# Patient Record
Sex: MALE | Race: WHITE | NOT HISPANIC OR LATINO
[De-identification: names, ages, dates, MRNs, and addresses within clinical notes are randomized per-mention and may not be internally consistent; named-entity substitution may affect disease eponyms.]

---
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
## 2021-01-04 ENCOUNTER — RX RENEWAL (OUTPATIENT)
Age: 21
End: 2021-01-04

## 2021-01-07 DIAGNOSIS — Q53.9 UNDESCENDED TESTICLE, UNSPECIFIED: ICD-10-CM

## 2021-01-25 ENCOUNTER — NON-APPOINTMENT (OUTPATIENT)
Age: 21
End: 2021-01-25

## 2021-01-25 ENCOUNTER — APPOINTMENT (OUTPATIENT)
Dept: PEDIATRIC ENDOCRINOLOGY | Facility: CLINIC | Age: 21
End: 2021-01-25
Payer: COMMERCIAL

## 2021-01-25 VITALS
SYSTOLIC BLOOD PRESSURE: 141 MMHG | TEMPERATURE: 208.22 F | WEIGHT: 83.33 LBS | HEART RATE: 123 BPM | DIASTOLIC BLOOD PRESSURE: 97 MMHG

## 2021-01-25 VITALS — SYSTOLIC BLOOD PRESSURE: 130 MMHG | HEART RATE: 108 BPM | DIASTOLIC BLOOD PRESSURE: 88 MMHG

## 2021-01-25 LAB
ALBUMIN SERPL ELPH-MCNC: 4.8 G/DL
ALP BLD-CCNC: 180 U/L
ALT SERPL-CCNC: 66 U/L
ANION GAP SERPL CALC-SCNC: 13 MMOL/L
AST SERPL-CCNC: 33 U/L
BASOPHILS # BLD AUTO: 0.03 K/UL
BASOPHILS NFR BLD AUTO: 0.3 %
BILIRUB SERPL-MCNC: <0.2 MG/DL
BUN SERPL-MCNC: 21 MG/DL
CALCIUM SERPL-MCNC: 10.4 MG/DL
CHLORIDE SERPL-SCNC: 95 MMOL/L
CO2 SERPL-SCNC: 33 MMOL/L
CREAT SERPL-MCNC: 0.72 MG/DL
EOSINOPHIL # BLD AUTO: 0.16 K/UL
EOSINOPHIL NFR BLD AUTO: 1.5 %
GLUCOSE SERPL-MCNC: 94 MG/DL
HCT VFR BLD CALC: 47 %
HGB BLD-MCNC: 14.4 G/DL
IGF-1 INTERP: NORMAL
IGF-I BLD-MCNC: 140 NG/ML
IMM GRANULOCYTES NFR BLD AUTO: 0.3 %
LYMPHOCYTES # BLD AUTO: 2.13 K/UL
LYMPHOCYTES NFR BLD AUTO: 20.2 %
MAN DIFF?: NORMAL
MCHC RBC-ENTMCNC: 27.8 PG
MCHC RBC-ENTMCNC: 30.6 GM/DL
MCV RBC AUTO: 90.7 FL
MONOCYTES # BLD AUTO: 0.55 K/UL
MONOCYTES NFR BLD AUTO: 5.2 %
NEUTROPHILS # BLD AUTO: 7.66 K/UL
NEUTROPHILS NFR BLD AUTO: 72.5 %
PLATELET # BLD AUTO: 377 K/UL
POTASSIUM SERPL-SCNC: 4.9 MMOL/L
PROT SERPL-MCNC: 7.7 G/DL
RBC # BLD: 5.18 M/UL
RBC # FLD: 12.3 %
SODIUM SERPL-SCNC: 141 MMOL/L
T4 FREE SERPL-MCNC: 1.1 NG/DL
WBC # FLD AUTO: 10.56 K/UL

## 2021-01-25 PROCEDURE — 99072 ADDL SUPL MATRL&STAF TM PHE: CPT

## 2021-01-25 PROCEDURE — 99215 OFFICE O/P EST HI 40 MIN: CPT

## 2021-01-25 NOTE — REVIEW OF SYSTEMS
[Nl] : Neurological [Pubertal Concerns] : pubertal concerns [Seizure] : seizures [Difficulties in Speech] : speech difficulties

## 2021-01-25 NOTE — PHYSICAL EXAM
[Healthy Appearing] : healthy appearing [Well Nourished] : well nourished [Interactive] : interactive [Normal Appearance] : normal appearance [Well formed] : well formed [Normally Set] : normally set [Normal S1 and S2] : normal S1 and S2 [Murmur] : no murmurs [Clear to Ausculation Bilaterally] : clear to auscultation bilaterally [Abdomen Soft] : soft [Abdomen Tenderness] : non-tender [] : no hepatosplenomegaly [4] : was Frederic stage 4 [Decreased Tone] : decreased tone [Normal] : normal  [de-identified] : alert but mostly non verbal,

## 2021-01-25 NOTE — PHYSICAL EXAM
[Healthy Appearing] : healthy appearing [Well Nourished] : well nourished [Interactive] : interactive [Normal Appearance] : normal appearance [Well formed] : well formed [Normally Set] : normally set [Normal S1 and S2] : normal S1 and S2 [Murmur] : no murmurs [Clear to Ausculation Bilaterally] : clear to auscultation bilaterally [Abdomen Soft] : soft [Abdomen Tenderness] : non-tender [] : no hepatosplenomegaly [4] : was Frederic stage 4 [Decreased Tone] : decreased tone [Normal] : normal  [de-identified] : alert but mostly non verbal,

## 2021-01-25 NOTE — HISTORY OF PRESENT ILLNESS
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  his case has been complicated by radiation necrosis. cortisol deficiency and hypothyroidism has been felt to be on a central basis. IN  the past IGF-1 levels have been normal to slightly low  , . He had issues with low Na  in the past  that may have been due to his feeding or his Trileptal  and have resolved.\par \par Avinash r had a bone density  in 2019 that was low, when corrected for his height  spine was normal but hip was low at\par -3.57 and -2.47 SD.\par Avinash has had low levels of testosterone with an elevated level of FSH, indicating testicular damage, likely secondary to his cancer therapies.  His testes have been nonpalpable and in the past testicular ultrasound has been ordered but has not been obtained.  In 2019 testosterone therapy was begun at 50 mg daily.  Avinash generally gets his injections monthly although there have been several interruptions in therapy.\par \par Avinash has been generally well since the time of the last visit.  Mom states that his level of activity and arousal can still vary from day to day but he is no longer having those long periods of lethargy.  He is having his physical therapy at home and he does walk with his mom.  Seizures appear to be under control.  Mom has a testicular ultrasound that has been scheduled.\par

## 2021-01-26 ENCOUNTER — APPOINTMENT (OUTPATIENT)
Dept: PEDIATRIC NEUROLOGY | Facility: CLINIC | Age: 21
End: 2021-01-26

## 2021-01-29 ENCOUNTER — RX RENEWAL (OUTPATIENT)
Age: 21
End: 2021-01-29

## 2021-01-29 LAB — OXCARBAZEPINE SERPL-MCNC: 37 UG/ML

## 2021-02-02 LAB
FREE T4-ESOTERIX: 1.06 NG/DL
IGF BINDING PROTEIN-3 (ESOTERIX-LAB): 3.36 MG/L

## 2021-02-03 ENCOUNTER — APPOINTMENT (OUTPATIENT)
Dept: PEDIATRIC NEUROLOGY | Facility: CLINIC | Age: 21
End: 2021-02-03
Payer: COMMERCIAL

## 2021-03-02 ENCOUNTER — RX RENEWAL (OUTPATIENT)
Age: 21
End: 2021-03-02

## 2021-03-29 ENCOUNTER — APPOINTMENT (OUTPATIENT)
Dept: PEDIATRIC NEUROLOGY | Facility: CLINIC | Age: 21
End: 2021-03-29
Payer: COMMERCIAL

## 2021-03-29 VITALS — TEMPERATURE: 208.76 F | WEIGHT: 84 LBS

## 2021-03-29 PROCEDURE — 99072 ADDL SUPL MATRL&STAF TM PHE: CPT

## 2021-03-29 PROCEDURE — 99213 OFFICE O/P EST LOW 20 MIN: CPT

## 2021-05-19 ENCOUNTER — APPOINTMENT (OUTPATIENT)
Dept: PEDIATRIC HEMATOLOGY/ONCOLOGY | Facility: CLINIC | Age: 21
End: 2021-05-19
Payer: COMMERCIAL

## 2021-05-19 VITALS — TEMPERATURE: 98 F | WEIGHT: 86.99 LBS

## 2021-05-19 PROCEDURE — 99215 OFFICE O/P EST HI 40 MIN: CPT

## 2021-05-19 PROCEDURE — 99072 ADDL SUPL MATRL&STAF TM PHE: CPT

## 2021-05-20 NOTE — SOCIAL HISTORY
[Mother] : mother [___ Brothers] : [unfilled] brothers [Secondhand Smoke] : no exposure to  secondhand smoke [FreeTextEntry1] : Special education [facility] program.

## 2021-05-20 NOTE — PHYSICAL EXAM
[Normal] : No murmurs, rubs, gallops [40: Disabled, requires special care and assistance.] : 40: Disabled, requires special care and assistance.  [Cervical Lymph Nodes Enlarged Posterior Bilaterally] : posterior cervical [Supraclavicular Lymph Nodes Enlarged Bilaterally] : supraclavicular [de-identified] : wheelchair bound, head with decreased skull growth posteriorly in area of radiation boost.  [de-identified] : asymmetric gaze; cataract seen in left eye.  [de-identified] : softly distended [de-identified] : unable to assess.  [de-identified] : limited ROM to all extremities, bilateral hands contracted.  [de-identified] : bilateral toes with several round brown pigmented macules [de-identified] : see neurology note.  [de-identified] : minimal affect; able to smile and shows understanding of conversation

## 2021-05-20 NOTE — CONSULT LETTER
[Dear  ___] : Dear  [unfilled], [Courtesy Letter:] : I had the pleasure of seeing your patient, [unfilled], in my office today. [Please see my note below.] : Please see my note below. [Consult Closing:] : Thank you very much for allowing me to participate in the care of this patient.  If you have any questions, please do not hesitate to contact me. [FreeTextEntry2] : Dr Joni Almazan\par [address], \[place] [address]\par [phone]\par  [FreeTextEntry1] : Avinash was seen for his annual follow-up appointment in the [facility] at the [facility].  [FreeTextEntry3] : \par DAMASO Hodges\par Family Nurse Practitioner \par [facility]\par [phone]\par \par \par   \par \par \par

## 2021-05-20 NOTE — HISTORY OF PRESENT ILLNESS
[Site: ____] : Site: [unfilled] [Dose: ____] : Dose: [unfilled] [de-identified] : 20.5 year-old male now 13.3 years off-therapy for Medulloblastoma s/p autogenic bone marrow transplant. Since his last visit in the Survivorship program on 2/10/2020,  SUKHI has been generally well without hospitalizations, surgeries or emergency room visits. See medication list below. SUKHI's post-treatment course has been complicated by neurological impairments, restrictive lung disease, obstructive sleep apnea, dysphagia, legally blind, hearing loss, panhypopituitarism, central hypothyroidism, wheelchair bound, epilepsy and scoliosis.  Denies having had COVID-19 and has recently been vaccinated. \par \par SUKHI has been attending a Boces Program at the [facility] and is in a special education program and has a lot of services which includes occupational therapy, speech therapy, vision therapy, physical therapy, and feeding therapy.  He has been doing remote learning due to COVID-19. Sukhi has been living at home with his parents and 2 brothers, he gets many hours of home care nursing each week..  SUKHI has been getting exercise in physical therapy several times a week and he goes in his stander for at least 45 minutes daily as well. He reported having a generally healthy diet, his diet consists of soft food and tube feeds, and mom reports it is well rounded. \par \par  [de-identified] : Celocoxib  YES \par Retinoic Acid   YES [de-identified] : 20,000 mg/m2  [de-identified] : 900 mg/m2  [de-identified] : 1,503 mg/m2  [de-identified] : 420 mg/m2 [de-identified] : 2,217 mg/m2  [de-identified] : YES [de-identified] : YES

## 2021-05-20 NOTE — REVIEW OF SYSTEMS
[Loss of Hearing] : loss of hearing [Negative] : Allergic/Immunologic [FreeTextEntry3] : legally blind. Frequent tearing.  [FreeTextEntry6] : restrictive lung disease, uses Bipap over night for obstructive sleep apnea.  [FreeTextEntry7] : nightly NG tube feeds.  [FreeTextEntry8] : reports undescended testicles.  [FreeTextEntry9] : wears brace daily for scoliosis.  [de-identified] : several round macules on bilateral toes, being followed by dermatology.   [de-identified] : global neurological deficits.

## 2021-08-05 ENCOUNTER — APPOINTMENT (OUTPATIENT)
Dept: PULMONOLOGY | Facility: CLINIC | Age: 21
End: 2021-08-05
Payer: COMMERCIAL

## 2021-08-05 VITALS
TEMPERATURE: 97 F | OXYGEN SATURATION: 95 % | HEIGHT: 60 IN | WEIGHT: 315 LBS | HEART RATE: 96 BPM | BODY MASS INDEX: 61.84 KG/M2 | DIASTOLIC BLOOD PRESSURE: 79 MMHG | SYSTOLIC BLOOD PRESSURE: 115 MMHG

## 2021-08-05 DIAGNOSIS — G47.36 SLEEP RELATED HYPOVENTILATION IN CONDITIONS CLASSIFIED ELSEWHERE: ICD-10-CM

## 2021-08-05 DIAGNOSIS — G47.9 SLEEP DISORDER, UNSPECIFIED: ICD-10-CM

## 2021-08-05 PROCEDURE — 99204 OFFICE O/P NEW MOD 45 MIN: CPT

## 2021-08-06 PROBLEM — G47.36 SLEEP-RELATED HYPOVENTILATION DUE TO MEDICAL CONDITION: Status: ACTIVE | Noted: 2021-08-05

## 2021-08-06 PROBLEM — G47.9 SLEEP DISTURBANCES: Status: ACTIVE | Noted: 2021-08-06

## 2021-08-06 NOTE — ASSESSMENT
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 Cullen is a 20 year old male with a history of anaplastic medulloblastoma s/p chemotherapy, RT, with subsequent seizure disorder, intellectual disability,  wheelchair bound on bipap at night who comes in to [facility] care. \par \par He is using bipap at night infrequently the indication of why he was using it in the first place was never clear. Patient did have a catastrophic event with his medulloblastoma which left him disabled. Given the significant neurological damage that occurred there could be significant abnormalities in his sleep breathing including central apneas, cheyne-velez respirations, and nocturnal hypoventilation. For this reason, the patient is required to obtain an inlab PSG with transcutaneous CO2 monitoring to evaluate not only the sleep disordered breathing but also his EEG pattern which may have abnormal sleep staging and progression. \par \par He was referred to the [facility] for a diagnostic PSG. THe will follow up with us after the PSG. If he requires bipap, he will need another titration study to determine if his current settings are in fact correct. \par \par His mother indicates that he has a resmed device which is not part of the recall. \par \par He received the COVID-19 vaccine. \par \par In terms of the cough, it appears chronic due to aspiration of saliva. No further intervention is needed at this time. \par \par He will follow up with after his sleep study. \par

## 2021-08-06 NOTE — HISTORY OF PRESENT ILLNESS
[Never] : never [Difficulty Maintaining Sleep] : difficulty maintaining sleep [TextBox_4] : Mr. Cullen is a 20 year old male with a history of anaplastic medulloblastoma s/p chemotherapy, RT, with subsequent seizure disorder, intellectual disability,  wheelchair bound on bipap at night who comes in to \[facility]\"" care. \par \par The patient's mother indicates that he was given a bipap machine because she was told it was to help "exercise his lungs" at night. He never underwent a sleep study for his bipap. The patient finds using the bipap machine cumbersome and takes it off after about an hour. The patient does not appear to be somnolent during the day and is engaged in activities and can participate mildly in some communication. \par \par The mother also indicates he has a chronic cough which has been attributed to chronic aspiration of his saliva. \par \par Patient cannot give Stovall at this time.  [Recent  Weight Gain] : no recent weight gain [Snoring] : no snoring [ESS] : N/A

## 2021-08-06 NOTE — PHYSICAL EXAM
[No Acute Distress] : no acute distress [Normal Oropharynx] : normal oropharynx [Low Lying Soft Palate] : low lying soft palate [IV] : Mallampati Class: IV [Normal Appearance] : normal appearance [No Neck Mass] : no neck mass [Normal Rate/Rhythm] : normal rate/rhythm [Normal S1, S2] : normal s1, s2 [No Murmurs] : no murmurs [No Resp Distress] : no resp distress [Clear to Auscultation Bilaterally] : clear to auscultation bilaterally [No Clubbing] : no clubbing [No Cyanosis] : no cyanosis [No Edema] : no edema [Oriented x3] : oriented x3 [Normal Affect] : normal affect [TextBox_2] : Sitting in wheelchair [TextBox_68] : poor inspiratory effort

## 2021-10-06 ENCOUNTER — RX RENEWAL (OUTPATIENT)
Age: 21
End: 2021-10-06

## 2021-12-20 ENCOUNTER — APPOINTMENT (OUTPATIENT)
Dept: PEDIATRIC ENDOCRINOLOGY | Facility: CLINIC | Age: 21
End: 2021-12-20

## 2021-12-23 ENCOUNTER — APPOINTMENT (OUTPATIENT)
Dept: PEDIATRIC ENDOCRINOLOGY | Facility: CLINIC | Age: 21
End: 2021-12-23
Payer: COMMERCIAL

## 2021-12-23 PROCEDURE — 99214 OFFICE O/P EST MOD 30 MIN: CPT | Mod: 95

## 2022-01-03 ENCOUNTER — APPOINTMENT (OUTPATIENT)
Dept: PEDIATRIC NEUROLOGY | Facility: CLINIC | Age: 22
End: 2022-01-03

## 2022-01-12 ENCOUNTER — APPOINTMENT (OUTPATIENT)
Dept: PEDIATRIC NEUROLOGY | Facility: CLINIC | Age: 22
End: 2022-01-12
Payer: COMMERCIAL

## 2022-01-12 PROCEDURE — 99213 OFFICE O/P EST LOW 20 MIN: CPT | Mod: 95

## 2022-01-12 RX ORDER — TESTOSTERONE ENANTHATE 200 MG/ML
200 INJECTION, SOLUTION INTRAMUSCULAR
Qty: 1 | Refills: 0 | Status: DISCONTINUED | COMMUNITY
Start: 2019-05-09 | End: 2022-01-12

## 2022-01-12 NOTE — HISTORY OF PRESENT ILLNESS
[Home] : at home, [unfilled] , at the time of the visit. 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  his case has been complicated by radiation necrosis. cortisol deficiency and hypothyroidism has been felt to be on a central basis. IN  the past IGF-1 levels have been normal to slightly low  , . He had issues with low Na  in the past  that may have been due to his feeding or his Trileptal  and have resolved.\par \par Avinash paredes had a bone density  in 2019 that was low, when corrected for his height  spine was normal but hip was low at\par -3.57 and -2.47 SD.\par Avinash has had low levels of testosterone with an elevated level of FSH, indicating testicular damage, likely secondary to his cancer therapies.  His testes have been nonpalpable , an ultrasound obtained  in 2/21 noted small but normal testes in the inguinal region In 2019 testosterone therapy was begun at 50 mg daily.  Avinash generally gets his injections monthly although there have been several interruptions in therapy.\par \par Avinash has been generally well since the time of the last visit.  Mom states that his level of activity and arousal can still vary from day to day but he is no longer having those long periods of lethargy.  He is having his physical therapy at home and he does walk with his mom.  Seizures appear to be under control. \par doing well, \par \par Since the last visit Avinash has gotten the J and J vaccine and a booster, He ha not needed stress steroids, \par \par \par except vaccine\par \par doing better on line , mi has pt equi at home \par \par nio seizures\par \par more awake periods \par tst ever 4 month \par \par \par \par \par \par

## 2022-01-12 NOTE — HISTORY OF PRESENT ILLNESS
[Home] : at home, [unfilled] , at the time of the visit. [Other Location: e.g. Home (Enter Location, City,State)___] : at [unfilled] [Mother] : mother [FreeTextEntry1] : Sukhi has hx of anaplastic medulloblastoma; s/p CTx, s/p RT, and subsequent radiation necrosis;  shunt. Last MRI Oct 2017 stable. He has partial complex seizures, on Trileptal. Typically, seizures are brief, eye rolling or deep breathing, lasting seconds, and occurring 1-2 times/month.\par He sees endo, cardiology, pulmonary.\par He was previously followed in BST clinic, and now in survivorship clinic.  \par Labs 1/25/2021 ordered by Dr. López: CBC & CMP stable, Na normal;  OXC 37 (10-35)\par Last visit 03/29/2021 ; no concerns\par \par 01/12/2022 follow up - TEB per mother's request\par Mother reports family stays home due to pandemic and did not want to take SUKHI out of the house. He is also doing virtual learning. He remains on Trileptal. Mother denies seizures\par \par MEDS:\par  - Trileptal 300 mg / 5 mL 7 mL (420mg) BID \par No side effects\par \par He continues to follow with survivorship clinic\par Transitioned to adult pulmonary

## 2022-01-12 NOTE — CONSULT LETTER
[Dear  ___] : Dear  [unfilled], [Consult Letter:] : I had the pleasure of evaluating your patient, [unfilled]. [Please see my note below.] : Please see my note below. [Consult Closing:] : Thank you very much for allowing me to participate in the care of this patient.  If you have any questions, please do not hesitate to contact me. [Sincerely,] : Sincerely, [FreeTextEntry3] : Lauren LEVIN\par Pediatric neurology attending\par Neurofibromatosis clinic Co-director\par [facility]\par [facility]\par Tel: [phone]\par Fax: [phone]\par

## 2022-01-12 NOTE — PHYSICAL EXAM
[Well Nourished] : well nourished [Interactive] : interactive [de-identified] : in wheelchair, answers questions

## 2022-01-12 NOTE — PHYSICAL EXAM
[de-identified] : sleepy but arousable; in wheel chair; spoke with me in few unclear words [de-identified] : no visual behavior; not tracking; sluggish pupil reaction. \par  [de-identified] : tried to reach for nose with left hand upon request

## 2022-01-12 NOTE — ASSESSMENT
[FreeTextEntry1] : 21 year old young man with history of anaplastic medulloblastoma s/p chemotherapy and radiation, s/p VPS, with history of radiation necrosis and seizure disorder, on Trileptal. Seizures are controlled. Exam limited due to TEB\par \par \par Plan:\par - Continue Trileptal 420 mg BID \par - labs - will forward order \par - Follow up with endocrinology, opthalmology, pulmonology \par - Follow up in 3 months\par Advised mother will start transition to adult neurology at next visit\par

## 2022-01-13 ENCOUNTER — NON-APPOINTMENT (OUTPATIENT)
Age: 22
End: 2022-01-13

## 2022-07-12 DIAGNOSIS — M85.80 OTHER SPECIFIED DISORDERS OF BONE DENSITY AND STRUCTURE, UNSPECIFIED SITE: ICD-10-CM

## 2022-07-12 DIAGNOSIS — Z79.899 OTHER LONG TERM (CURRENT) DRUG THERAPY: ICD-10-CM

## 2022-07-13 ENCOUNTER — APPOINTMENT (OUTPATIENT)
Dept: PEDIATRIC NEUROLOGY | Facility: CLINIC | Age: 22
End: 2022-07-13

## 2022-07-13 VITALS — WEIGHT: 85.19 LBS

## 2022-07-13 LAB
ALBUMIN SERPL ELPH-MCNC: 4.7 G/DL
ALP BLD-CCNC: 144 U/L
ALT SERPL-CCNC: 56 U/L
ANION GAP SERPL CALC-SCNC: 12 MMOL/L
AST SERPL-CCNC: 27 U/L
BILIRUB SERPL-MCNC: <0.2 MG/DL
BUN SERPL-MCNC: 16 MG/DL
CALCIUM SERPL-MCNC: 9.8 MG/DL
CHLORIDE SERPL-SCNC: 94 MMOL/L
CO2 SERPL-SCNC: 33 MMOL/L
CREAT SERPL-MCNC: 0.5 MG/DL
EGFR: 150 ML/MIN/1.73M2
GLUCOSE SERPL-MCNC: 98 MG/DL
POTASSIUM SERPL-SCNC: 4.2 MMOL/L
PROT SERPL-MCNC: 7.5 G/DL
SODIUM SERPL-SCNC: 139 MMOL/L

## 2022-07-13 PROCEDURE — 99213 OFFICE O/P EST LOW 20 MIN: CPT

## 2022-07-13 NOTE — PHYSICAL EXAM
[de-identified] : sleeping comfortably in the wheel chair [de-identified] : sleeping, eyes are closed [de-identified] : increased tone LE more than UE [de-identified] : brisk DTRs [de-identified] : non ambulatory

## 2022-07-13 NOTE — HISTORY OF PRESENT ILLNESS
[FreeTextEntry1] : Avinash has hx of anaplastic medulloblastoma; s/p CTx, s/p RT, and subsequent radiation necrosis;  shunt. Last MRI Oct 2017 stable. He has partial complex seizures, on Trileptal. Typically, seizures are brief, eye rolling or deep breathing, lasting seconds, and occurring 1-2 times/month.\par He sees endo, cardiology, pulmonary.\par He was previously followed in BST clinic, and now in survivorship clinic.  \par Last labs 1/25/2021 ordered by Dr. López: CBC & CMP stable, Na normal;  OXC 37 (10-35)\par \par Last visit 01/12/2022 TEB per mother's request: He remains on Trileptal. Mother denies seizures\par _________________\par \par 07/13/2022 follow up \par Mother reports brief seizures that happen when waking up from sleep, startle like behaviors, the eyes. Not more than that. Mother does not feel that he needs higher dose.\par \par MEDS:\par  - Trileptal 300 mg / 5 mL 7 mL (420mg) BID \par No side effects\par \par He continues to follow with survivorship clinic and will be transitioning to adult endo\par

## 2022-07-13 NOTE — REASON FOR VISIT
[Follow-Up Evaluation] : a follow-up evaluation for [Seizure Disorder] : seizure disorder [Mother] : mother [Other: _____] : [unfilled]

## 2022-07-13 NOTE — ASSESSMENT
[FreeTextEntry1] : 21 year old young man with history of anaplastic medulloblastoma s/p chemotherapy and radiation, s/p VPS, with history of radiation necrosis and seizure disorder, on Trileptal. Seizures are controlled. Exam stable and unchanged\par \par Plan:\par - Continue Trileptal 420 mg BID \par - labs  \par - Follow up with endocrinology, opthalmology, pulmonology \par - Transition to adult neurology; referral given\par \par

## 2022-07-13 NOTE — CONSULT LETTER
[Dear  ___] : Dear  [unfilled], [Consult Letter:] : I had the pleasure of evaluating your patient, [unfilled]. [Please see my note below.] : Please see my note below. [Consult Closing:] : Thank you very much for allowing me to participate in the care of this patient.  If you have any questions, please do not hesitate to contact me. [Sincerely,] : Sincerely, [FreeTextEntry3] : Lauren LEVIN\par Pediatric neurology attending\par Neurofibromatosis clinic Co-director\par [facility]\par [facility]\par Tel: [phone]\par Fax: [phone]\par

## 2022-07-25 ENCOUNTER — APPOINTMENT (OUTPATIENT)
Dept: PEDIATRIC HEMATOLOGY/ONCOLOGY | Facility: CLINIC | Age: 22
End: 2022-07-25

## 2022-07-25 VITALS
WEIGHT: 86.2 LBS | DIASTOLIC BLOOD PRESSURE: 74 MMHG | TEMPERATURE: 97.2 F | SYSTOLIC BLOOD PRESSURE: 106 MMHG | HEART RATE: 89 BPM

## 2022-07-25 DIAGNOSIS — H91.90 UNSPECIFIED HEARING LOSS, UNSPECIFIED EAR: ICD-10-CM

## 2022-07-25 DIAGNOSIS — H54.7 UNSPECIFIED VISUAL LOSS: ICD-10-CM

## 2022-07-25 DIAGNOSIS — Z99.89 DEPENDENCE ON OTHER ENABLING MACHINES AND DEVICES: ICD-10-CM

## 2022-07-25 DIAGNOSIS — Z92.3 PERSONAL HISTORY OF IRRADIATION: ICD-10-CM

## 2022-07-25 DIAGNOSIS — Z91.89 OTHER SPECIFIED PERSONAL RISK FACTORS, NOT ELSEWHERE CLASSIFIED: ICD-10-CM

## 2022-07-25 DIAGNOSIS — Z08 ENCOUNTER FOR FOLLOW-UP EXAMINATION AFTER COMPLETED TREATMENT FOR MALIGNANT NEOPLASM: ICD-10-CM

## 2022-07-25 DIAGNOSIS — Z92.21 PERSONAL HISTORY OF ANTINEOPLASTIC CHEMOTHERAPY: ICD-10-CM

## 2022-07-25 DIAGNOSIS — F88 OTHER DISORDERS OF PSYCHOLOGICAL DEVELOPMENT: ICD-10-CM

## 2022-07-25 DIAGNOSIS — Z94.81 BONE MARROW TRANSPLANT STATUS: ICD-10-CM

## 2022-07-25 PROCEDURE — 99215 OFFICE O/P EST HI 40 MIN: CPT

## 2022-07-26 PROBLEM — Z92.21 HISTORY OF CYTOXAN EXPOSURE: Status: ACTIVE | Noted: 2019-01-10

## 2022-07-26 PROBLEM — Z91.89 AT RISK FOR CORONARY ARTERY DISEASE: Status: ACTIVE | Noted: 2022-07-25

## 2022-07-26 PROBLEM — Z92.21 PERSONAL HISTORY OF ANTINEOPLASTIC CHEMOTHERAPY: Status: ACTIVE | Noted: 2019-01-10

## 2022-07-26 PROBLEM — Z94.81 HISTORY OF BONE MARROW TRANSPLANT: Status: ACTIVE | Noted: 2020-02-11

## 2022-07-26 PROBLEM — Z92.3 HISTORY OF RADIATION EXPOSURE: Status: ACTIVE | Noted: 2019-01-10

## 2022-07-26 PROBLEM — Z91.89 AT RISK FOR CARDIOMYOPATHY: Status: ACTIVE | Noted: 2022-07-12

## 2022-07-26 PROBLEM — Z08 ENCOUNTER FOR ROUTINE CANCER FOLLOW-UP: Status: ACTIVE | Noted: 2019-01-03

## 2022-07-26 PROBLEM — H91.90 HEARING LOSS: Status: ACTIVE | Noted: 2019-01-10

## 2022-07-26 NOTE — SOCIAL HISTORY
[Mother] : mother [Father] : father [___ Brothers] : [unfilled] brothers [Secondhand Smoke] : no exposure to  secondhand smoke [FreeTextEntry1] : special education Boc program.; last academic year starting in September 2022 and then will age out at age 22.

## 2022-07-26 NOTE — REVIEW OF SYSTEMS
[Loss of Hearing] : loss of hearing [Negative] : Allergic/Immunologic [FreeTextEntry3] : legally blind. Frequent tearing.  [FreeTextEntry6] : restrictive lung disease, uses Bipap over night for obstructive sleep apnea.  [FreeTextEntry7] : intermittent NG tube feeds.  [FreeTextEntry8] : reports undescended testicles.  [FreeTextEntry9] : history of brace wearing for scoliosis.  [de-identified] : several round macules on bilateral toes, being followed by dermatology.   [de-identified] : global neurological deficits.  [de-identified] : difficult to assess  [de-identified] : see HPI

## 2022-07-26 NOTE — CONSULT LETTER
[Courtesy Letter:] : I had the pleasure of seeing your patient, [unfilled], in my office today. [Please see my note below.] : Please see my note below. [Consult Closing:] : Thank you very much for allowing me to participate in the care of this patient.  If you have any questions, please do not hesitate to contact me. [Dear  ___] : Dear  [unfilled], [FreeTextEntry2] : DR CHELE HALE \par [facility] \par [address] \[place] [address]\par [phone]  [FreeTextEntry1] : Avinash was seen for his annual follow-up appointment in the [facility] Program at the [facility].  [FreeTextEntry3] : \par DAMASO Hodges\par Family Nurse Practitioner \par [facility]\par \par \par \par   \par \par \par

## 2022-07-26 NOTE — PHYSICAL EXAM
[Normal] : No murmurs, rubs, gallops [Cervical Lymph Nodes Enlarged Posterior Bilaterally] : posterior cervical [Supraclavicular Lymph Nodes Enlarged Bilaterally] : supraclavicular [40: Disabled, requires special care and assistance.] : 40: Disabled, requires special care and assistance.  [de-identified] : wheelchair bound, head with decreased skull growth posteriorly in area of radiation boost.  [de-identified] : asymmetric gaze; cataract seen in left eye.  [de-identified] : softly distended [de-identified] : unable to assess.  [de-identified] : limited ROM to all extremities, bilateral hands contracted.  [de-identified] : bilateral toes with several round brown pigmented macules [de-identified] : see neurology note.  [de-identified] : minimal affect; able to smile and shows understanding of conversation [FreeTextEntry1] : unable to measure

## 2022-07-26 NOTE — HISTORY OF PRESENT ILLNESS
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 Sukhi was diagnosed on 3/7/2007 at 5.3 years old and completed his treatment on on 2/9/2008 at 6.2 years old. He was treated as per the HEADSTART II protocol. Since his last visit in the Survivorship program on,  SUKHI has been generally well without hospitalizations, surgeries or emergency room visits. SUKHI's post-treatment course has been complicated by\par \par 1.global delays\par 2.epilepsy, for which he follows neurology\par 3.restrictive lung disease and obstructive sleep apnea, for which he follows pulmonology\par 4. dysphagia, for which he follows gastroenterology \par 5. legally blind, for which he follows opthalmology\par 6.hearing loss for which he follows audiology \par 7.panhypopituitarism, central hypothyroidism, low testosterone and low bone mineral density, for which he follows endocrinology \par \par He is also followed by cardiology for risks of cardiomyopathy and carotid artery and subclavian artery disease; He is followed annually by dermatology and dental. His mom reports that Sukhi did not have the COVID-19 virus and has been fully vaccinated.  \par \par SUKHI has been attending a Boces Program at the [facility] and is in a special education program and has a lot of services which includes occupational therapy, speech therapy, vision therapy, physical therapy, and feeding therapy.  He has been doing remote learning since the start of the COVID-19 pandemic and mom reports that he is thriving at home without the learning interruptions of fellow classmates. Sukhi has 12 hour skilled nursing care at home, where he lives with his parents and 2 brothers. SUKHI has been getting exercise in physical therapy several times a week and he goes in his stander for at least 45 minutes daily as well. He reported having a generally healthy  diet, his diet consists of soft food and tube feeds, He can also drink from a straw.\par \par There have not been any significant medical changes for Sukhi since his last visit in the survivorship program.  His mother reports that they have completed the guardianship paperwork.   [de-identified] : Celocoxib  YES \par Retinoic Acid   YES [de-identified] : 20,000 mg/m2  [de-identified] : 900 mg/m2  [de-identified] : 1,503 mg/m2  [de-identified] : 420 mg/m2 [de-identified] : 2,217 mg/m2  [de-identified] : YES [de-identified] : YES

## 2022-07-29 LAB — OXCARBAZEPINE SERPL-MCNC: 35 UG/ML

## 2022-12-11 ENCOUNTER — NON-APPOINTMENT (OUTPATIENT)
Age: 22
End: 2022-12-11

## 2022-12-14 ENCOUNTER — APPOINTMENT (OUTPATIENT)
Dept: PEDIATRIC NEUROLOGY | Facility: CLINIC | Age: 22
End: 2022-12-14

## 2022-12-14 VITALS — WEIGHT: 107.12 LBS

## 2022-12-14 DIAGNOSIS — G40.909 EPILEPSY, UNSPECIFIED, NOT INTRACTABLE, W/OUT STATUS EPILEPTICUS: ICD-10-CM

## 2022-12-14 PROCEDURE — 99214 OFFICE O/P EST MOD 30 MIN: CPT

## 2022-12-15 LAB
ALBUMIN SERPL ELPH-MCNC: 4.8 G/DL
ALP BLD-CCNC: 146 U/L
ALT SERPL-CCNC: 48 U/L
ANION GAP SERPL CALC-SCNC: 20 MMOL/L
AST SERPL-CCNC: 35 U/L
BASOPHILS # BLD AUTO: 0.06 K/UL
BASOPHILS NFR BLD AUTO: 0.6 %
BILIRUB SERPL-MCNC: <0.2 MG/DL
BUN SERPL-MCNC: 21 MG/DL
CALCIUM SERPL-MCNC: 10.1 MG/DL
CHLORIDE SERPL-SCNC: 97 MMOL/L
CO2 SERPL-SCNC: 25 MMOL/L
CREAT SERPL-MCNC: 0.52 MG/DL
EGFR: 147 ML/MIN/1.73M2
EOSINOPHIL # BLD AUTO: 0.29 K/UL
EOSINOPHIL NFR BLD AUTO: 2.8 %
GLUCOSE SERPL-MCNC: 85 MG/DL
HCT VFR BLD CALC: 47.1 %
HGB BLD-MCNC: 14.4 G/DL
IMM GRANULOCYTES NFR BLD AUTO: 0.3 %
LYMPHOCYTES # BLD AUTO: 3.93 K/UL
LYMPHOCYTES NFR BLD AUTO: 38.3 %
MAN DIFF?: NORMAL
MCHC RBC-ENTMCNC: 27.7 PG
MCHC RBC-ENTMCNC: 30.6 GM/DL
MCV RBC AUTO: 90.8 FL
MONOCYTES # BLD AUTO: 0.85 K/UL
MONOCYTES NFR BLD AUTO: 8.3 %
NEUTROPHILS # BLD AUTO: 5.11 K/UL
NEUTROPHILS NFR BLD AUTO: 49.7 %
PLATELET # BLD AUTO: 384 K/UL
POTASSIUM SERPL-SCNC: 4.6 MMOL/L
PROT SERPL-MCNC: 7.7 G/DL
RBC # BLD: 5.19 M/UL
RBC # FLD: 12.5 %
SODIUM SERPL-SCNC: 141 MMOL/L
WBC # FLD AUTO: 10.27 K/UL

## 2022-12-15 NOTE — ASSESSMENT
[FreeTextEntry1] : 22 year old young man with history of anaplastic medulloblastoma s/p chemotherapy and radiation, s/p VPS, with history of radiation necrosis and seizure disorder, on Trileptal. Seizures are controlled. Exam stable and unchanged\par \par Plan:\par - Continue Trileptal 420 mg BID \par - labs  \par - Follow up with endocrinology, opthalmology, pulmonology \par - Transition to adult neurology; referral given\par \par

## 2022-12-15 NOTE — PHYSICAL EXAM
[de-identified] : in a wheel chair; awake, in NAD, making some sounds [de-identified] : sleeping, eyes are closed [de-identified] : increased tone LE more than UE [de-identified] : brisk DTRs [de-identified] : non ambulatory

## 2022-12-15 NOTE — HISTORY OF PRESENT ILLNESS
[FreeTextEntry1] : Avinash has hx of anaplastic medulloblastoma; s/p CTx, s/p RT, and subsequent radiation necrosis;  shunt. Last MRI Oct 2017 stable. He has partial complex seizures, on Trileptal. Typically, seizures are brief, eye rolling or deep breathing, lasting seconds, and occurring 1-2 times/month.\par He sees endo, cardiology, pulmonary.\par He was previously followed in BST clinic, and now in survivorship clinic.  \par \par last visit 07/13/2022 Mother reports brief seizures that happen when waking up from sleep, startle like behaviors, the eyes. Not more than that. Mother does not feel that he needs higher dose.\par Plan: transition to adult neurology\par [facility] level 7/13/22 35 (10-35)\par \par ___________________\par \par 12/14/2022 follow up\par Above reviewed with mother; she states she is here as [facility] needs a new refill on meds; medicaid is requesting new rx every 3 months; as per mother seizures are the same; seizures "miniscule" when waking up ; just opens eyes wide. Mother denies convulsive seizures\par \par MEDS:\par - Trileptal 300 mg / 5 mL 7 mL (420mg) BID \par No side effects\par \par He continues to follow with survivorship clinic and will be transitioning to adult endo

## 2022-12-15 NOTE — CONSULT LETTER
[FreeTextEntry3] : Lauren LEVIN\par Pediatric neurology attending\par Neurofibromatosis clinic Co-director\par [facility]\par [facility]\par Tel: [phone]\par Fax: [phone]\par

## 2022-12-22 ENCOUNTER — NON-APPOINTMENT (OUTPATIENT)
Age: 22
End: 2022-12-22

## 2022-12-23 LAB — OXCARBAZEPINE SERPL-MCNC: 44 UG/ML

## 2023-01-18 ENCOUNTER — APPOINTMENT (OUTPATIENT)
Dept: PEDIATRIC ENDOCRINOLOGY | Facility: CLINIC | Age: 23
End: 2023-01-18

## 2023-02-08 ENCOUNTER — APPOINTMENT (OUTPATIENT)
Dept: PEDIATRIC ENDOCRINOLOGY | Facility: CLINIC | Age: 23
End: 2023-02-08
Payer: COMMERCIAL

## 2023-02-08 VITALS
HEIGHT: 60 IN | SYSTOLIC BLOOD PRESSURE: 123 MMHG | HEART RATE: 111 BPM | BODY MASS INDEX: 17.75 KG/M2 | WEIGHT: 90.39 LBS | DIASTOLIC BLOOD PRESSURE: 77 MMHG

## 2023-02-08 DIAGNOSIS — C71.6 MALIGNANT NEOPLASM OF CEREBELLUM: ICD-10-CM

## 2023-02-08 PROCEDURE — 99215 OFFICE O/P EST HI 40 MIN: CPT

## 2023-02-09 ENCOUNTER — NON-APPOINTMENT (OUTPATIENT)
Age: 23
End: 2023-02-09

## 2023-02-09 RX ORDER — HYDROCORTISONE ACETATE 25 MG/1
25 SUPPOSITORY RECTAL
Qty: 20 | Refills: 0 | Status: ACTIVE | COMMUNITY
Start: 2017-02-08 | End: 1900-01-01

## 2023-02-09 RX ORDER — SYRINGE W-NEEDLE,DISPOSAB,3 ML 22GX1"
22G X 1" SYRINGE, EMPTY DISPOSABLE MISCELLANEOUS
Qty: 5 | Refills: 0 | Status: ACTIVE | COMMUNITY
Start: 2023-02-09 | End: 1900-01-01

## 2023-02-09 NOTE — DISCUSSION/SUMMARY
[FreeTextEntry1] : Avinash is a 21 yo male with cortisol deficiency and and hypothyroidism associated with anaplastic medulloblastoma with metastases to the C-spine s/p resection 2007, chemo and radiation 2008 with  shunt revised Feb 2012, blindness, seizure disorder, and restrictive lung disease presenting for follow-up visit for his cortisol deficiency and hypothyroidism. Patient is doing well and is stable. Since last seen he has required only one course of stress dosing for his steroids. Based on his BSA, no changes need to be made for his hydrocortisone dosing. Patient has been developing well but will benefit from an increase in his amount of testosterone, will change regimen to Testosterone 100mg same dose but every 3 weeks. Patient has been missing Synthroid dosing due to requiring a PA, will pursue PAsince patient requires Synthroid due to intolerance and suboptimal treatment with levothyroxine. Patient will need to transition to adult Endocrinology with his next visit, recommended to establish care with them in 4-5 months, resources given. \par \par Will renew scripts for all medication, inclusing stress

## 2023-02-09 NOTE — HISTORY OF PRESENT ILLNESS
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 his case has been complicated by radiation necrosis. 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  IN the past IGF-1 levels have been normal to slightly low ,. He had issues with low Na in the past that may have been due to his feeding or his Trileptal and have resolved.\par \par Avinash r had a bone density in 2019 that was low, when corrected for his height spine was normal but hip was low at-3.57 and -2.47 SD.\par Avinash has had low levels of testosterone with an elevated level of FSH, indicating testicular damage, likely secondary to his cancer therapies. His testes have been nonpalpable , an ultrasound obtained in 2/21 noted small but normal testes in the inguinal region In 2019 testosterone therapy was begun at 50 mg daily. Avinash generally gets his injections monthly although there have been several interruptions in therapy.\par \par Avinash was last seen  in Nov 2021. Testosterone was raised to 100 mg monthly . Avinash has been generally well since the time of the last visit. Mom states that his level of activity and arousal is  at baseline. He is having his physical therapy at home and he does walk with his mom. Seizures appear to be under control and doing well.\par \par No hospitalizations, ED/UC visits since last seen. Got stress dosing when he tested positive for COVID in Deemeber 2022, was triple his oral dose. No longer have active scripts for solucortef and suppository. Also got the flu this year but did not require stress dosing for it. \par \katarina Takes Hydrocortisone 10mg in AM and 2.5mg in PM. Needs scripts called in for 2.5mg and for testosterone. Needs prior auth for synthroid, has been paying out of pocket. Taking Synthroid 100mcg daily but has been missing many doses due to issues with prior auth. Testosterone is 100mg monthly in legs.\par \par Avinash still has periods of lethargy that have been persistent with no etiology. His lethargy tends to happen late in the morning and will regain energy in early afternoon. Then 6pm will have his lethargy again but if he is allowed to sleep then he will be up in the middle of the night. Diet is mostly Boost, but also eats food. \par \par Will have occasional seizures when waking up but has been well-managed. Last saw Neurology in Nov 2022, have yet to make appt for transition to adult but plans to soon. \par \par Has gotten J&J COVID vaxx and Moderna booster. No flu shot this year. \par

## 2023-02-09 NOTE — PHYSICAL EXAM
[Healthy Appearing] : healthy appearing [Interactive] : interactive [Normal S1 and S2] : normal S1 and S2 [Clear to Ausculation Bilaterally] : clear to auscultation bilaterally [Abdomen Soft] : soft [Abdomen Tenderness] : non-tender [] : no hepatosplenomegaly [Normal] : grossly intact [Normal for Age] : was normal for age [Murmur] : no murmurs [de-identified] : Sitting in wheelchair [de-identified] : Spase hair  [de-identified] : N [FreeTextEntry1] : None  [de-identified] : Baseline for patient  [de-identified] : No acute findings different than baseline

## 2023-06-16 ENCOUNTER — RX RENEWAL (OUTPATIENT)
Age: 23
End: 2023-06-16

## 2023-11-20 ENCOUNTER — RX RENEWAL (OUTPATIENT)
Age: 23
End: 2023-11-20

## 2023-12-24 ENCOUNTER — RX RENEWAL (OUTPATIENT)
Age: 23
End: 2023-12-24

## 2024-01-02 ENCOUNTER — APPOINTMENT (OUTPATIENT)
Dept: NEUROLOGY | Facility: CLINIC | Age: 24
End: 2024-01-02

## 2024-01-23 ENCOUNTER — RX RENEWAL (OUTPATIENT)
Age: 24
End: 2024-01-23

## 2024-01-26 ENCOUNTER — APPOINTMENT (OUTPATIENT)
Dept: NEUROLOGY | Facility: CLINIC | Age: 24
End: 2024-01-26

## 2024-01-28 ENCOUNTER — RX RENEWAL (OUTPATIENT)
Age: 24
End: 2024-01-28

## 2024-02-14 ENCOUNTER — APPOINTMENT (OUTPATIENT)
Dept: NEUROLOGY | Facility: CLINIC | Age: 24
End: 2024-02-14

## 2024-02-14 ENCOUNTER — APPOINTMENT (OUTPATIENT)
Dept: NEUROLOGY | Facility: CLINIC | Age: 24
End: 2024-02-14
Payer: COMMERCIAL

## 2024-02-14 VITALS
WEIGHT: 92 LBS | HEART RATE: 98 BPM | BODY MASS INDEX: 18.06 KG/M2 | HEIGHT: 60 IN | DIASTOLIC BLOOD PRESSURE: 72 MMHG | TEMPERATURE: 98 F | SYSTOLIC BLOOD PRESSURE: 117 MMHG | RESPIRATION RATE: 15 BRPM | OXYGEN SATURATION: 96 %

## 2024-02-14 PROCEDURE — 99204 OFFICE O/P NEW MOD 45 MIN: CPT

## 2024-02-23 NOTE — ASSESSMENT
[FreeTextEntry1] : 24 yo man with development delay and focal epilepsy.  Mother reports brief breakthrough focal seizures, typically out of sleep.  Plan: 1. 48-hr Amb EEG 2. Continue OXC liquid (300mg/5mL) 7mL PO BID 3. Seizure precautions discussed 4. Patient agrees with plan.  5. Follow up in 6 months  Iggy Lyon MD [facility]  Greater than 50% of the encounter was spent on counseling and coordination of care discussing differential diagnosis, diagnostic testing, and treatment options. We have talked about appropriate follow up, and I have spent 45 minutes of face to face time with the patient.  More than 50% of time spent counseling and educating patient about epilepsy specific safety issues including ASM side effects and interactions, alcohol consumption, sleep deprivation, risks and driving privileges associated with the [place] Guidelines, what is SUDEP and death related to seizures/SUDEP, seizure 1st aid and risks. Patient is educated on seizure precautions, including no driving, no operating machinery, no swimming or bathing, no climbing heights, or engage in any risky activities during which a seizure could cause further injury to pt or others.

## 2024-02-23 NOTE — PHYSICAL EXAM
[FreeTextEntry1] : Awake, alert, does not make eye contact, has a few words but very limited verbal output.  Follows simple commands.  Affect normal.  Cranial nerves: blind, PERRL, +right gaze preference, +left central facial paresis, tongue midline.  Motor exam: +spastic quadriparesis with less movement in LUE/LLE, but can raise all limbs antigravity.  No tremors or fasciculations.  Sensory exam: intact to LT.  DTRs: 3+ throughout. Coordination: unable to assess  Gait: wheelchair bound, non-ambulatory.

## 2024-02-23 NOTE — HISTORY OF PRESENT ILLNESS
[FreeTextEntry1] : 22 yo LH man, accompanied by his mother, transferring care to adult neurology, was followed in peds neuro by Dr Luo.  PMHx: anaplastic medulloblastoma; s/p CTx, Rtx; subsequent radiation necrosis;  shunt due to hydrocephalus, focal epilepsy, global developmental delay, blindness, adrenal insufficiency, central hypothyroidism,   Seizure Description: seizures only occur out of sleep and are brief periods of eye opening with nystagmus for 2-3 seconds.  This used to be associated with bilateral tonic posturing/flexion of the arms, but this component has subsided with AED use.    Seizure Frequency: 2-3 times per week, during sleep only  Labs were drawn recently drawn by PCP including OXC level  Current AEDs:  OXC liquid 7mL PO BID Diastat prn  Previous AEDs: PHT  PCP: Dr Yesy Cortez (in [place]), [phone]  MRI brain (2013): suboccipital craniotomy with post-operative changes in posterior fossa, volume loss in cerebellum and brainstem, right frontal  shunt,

## 2024-03-04 ENCOUNTER — RX RENEWAL (OUTPATIENT)
Age: 24
End: 2024-03-04

## 2024-03-29 ENCOUNTER — APPOINTMENT (OUTPATIENT)
Dept: NEUROLOGY | Facility: CLINIC | Age: 24
End: 2024-03-29
Payer: COMMERCIAL

## 2024-03-29 PROCEDURE — 95816 EEG AWAKE AND DROWSY: CPT

## 2024-03-29 PROCEDURE — 93040 RHYTHM ECG WITH REPORT: CPT

## 2024-03-30 PROCEDURE — 95721 EEG PHY/QHP>36<60 HR W/O VID: CPT

## 2024-03-30 PROCEDURE — 95708 EEG WO VID EA 12-26HR UNMNTR: CPT

## 2024-03-30 PROCEDURE — 95700 EEG CONT REC W/VID EEG TECH: CPT

## 2024-06-26 ENCOUNTER — APPOINTMENT (OUTPATIENT)
Dept: ENDOCRINOLOGY | Facility: CLINIC | Age: 24
End: 2024-06-26
Payer: COMMERCIAL

## 2024-06-26 VITALS
WEIGHT: 92 LBS | RESPIRATION RATE: 16 BRPM | OXYGEN SATURATION: 87 % | DIASTOLIC BLOOD PRESSURE: 64 MMHG | BODY MASS INDEX: 18.06 KG/M2 | HEART RATE: 103 BPM | SYSTOLIC BLOOD PRESSURE: 108 MMHG | HEIGHT: 60 IN

## 2024-06-26 DIAGNOSIS — E03.8 OTHER SPECIFIED HYPOTHYROIDISM: ICD-10-CM

## 2024-06-26 DIAGNOSIS — E29.1 TESTICULAR HYPOFUNCTION: ICD-10-CM

## 2024-06-26 DIAGNOSIS — I10 ESSENTIAL (PRIMARY) HYPERTENSION: ICD-10-CM

## 2024-06-26 DIAGNOSIS — E55.9 VITAMIN D DEFICIENCY, UNSPECIFIED: ICD-10-CM

## 2024-06-26 DIAGNOSIS — E27.40 UNSPECIFIED ADRENOCORTICAL INSUFFICIENCY: ICD-10-CM

## 2024-06-26 PROCEDURE — 99204 OFFICE O/P NEW MOD 45 MIN: CPT

## 2024-06-26 RX ORDER — TESTOSTERONE CYPIONATE 200 MG/ML
200 INJECTION, SOLUTION INTRAMUSCULAR
Qty: 1 | Refills: 0 | Status: ACTIVE | COMMUNITY
Start: 2019-05-14 | End: 1900-01-01

## 2024-06-26 RX ORDER — HYDROCORTISONE SODIUM SUCCINATE 100 MG/2ML
100 INJECTION, POWDER, FOR SOLUTION INTRAMUSCULAR; INTRAVENOUS
Qty: 1 | Refills: 5 | Status: ACTIVE | COMMUNITY
Start: 2021-01-07 | End: 1900-01-01

## 2024-06-27 ENCOUNTER — RX RENEWAL (OUTPATIENT)
Age: 24
End: 2024-06-27

## 2024-06-28 ENCOUNTER — RX RENEWAL (OUTPATIENT)
Age: 24
End: 2024-06-28

## 2024-09-28 ENCOUNTER — OUTPATIENT (OUTPATIENT)
Dept: OUTPATIENT SERVICES | Age: 24
LOS: 1 days | End: 2024-09-28

## 2024-09-28 ENCOUNTER — APPOINTMENT (OUTPATIENT)
Dept: MRI IMAGING | Facility: HOSPITAL | Age: 24
End: 2024-09-28

## 2024-09-28 DIAGNOSIS — G91.9 HYDROCEPHALUS, UNSPECIFIED: ICD-10-CM

## 2024-09-28 PROCEDURE — 70542 MRI ORBIT/FACE/NECK W/DYE: CPT | Mod: 26

## 2024-09-28 PROCEDURE — 70551 MRI BRAIN STEM W/O DYE: CPT | Mod: 26

## 2024-10-01 RX ORDER — TESTOSTERONE CYPIONATE 200 MG/ML
200 INJECTION, SOLUTION INTRAMUSCULAR
Qty: 1 | Refills: 0 | Status: ACTIVE | COMMUNITY
Start: 2024-10-01 | End: 1900-01-01

## 2024-10-04 ENCOUNTER — TRANSCRIPTION ENCOUNTER (OUTPATIENT)
Age: 24
End: 2024-10-04

## 2024-12-03 ENCOUNTER — APPOINTMENT (OUTPATIENT)
Dept: ENDOCRINOLOGY | Facility: CLINIC | Age: 24
End: 2024-12-03
Payer: COMMERCIAL

## 2024-12-03 VITALS
HEIGHT: 60 IN | RESPIRATION RATE: 16 BRPM | BODY MASS INDEX: 18.06 KG/M2 | WEIGHT: 92 LBS | TEMPERATURE: 98 F | SYSTOLIC BLOOD PRESSURE: 106 MMHG | DIASTOLIC BLOOD PRESSURE: 70 MMHG | OXYGEN SATURATION: 96 % | HEART RATE: 101 BPM

## 2024-12-03 DIAGNOSIS — E03.8 OTHER SPECIFIED HYPOTHYROIDISM: ICD-10-CM

## 2024-12-03 DIAGNOSIS — E29.1 TESTICULAR HYPOFUNCTION: ICD-10-CM

## 2024-12-03 DIAGNOSIS — E27.40 UNSPECIFIED ADRENOCORTICAL INSUFFICIENCY: ICD-10-CM

## 2024-12-03 DIAGNOSIS — I10 ESSENTIAL (PRIMARY) HYPERTENSION: ICD-10-CM

## 2024-12-03 PROCEDURE — 99214 OFFICE O/P EST MOD 30 MIN: CPT

## 2025-02-03 ENCOUNTER — RX RENEWAL (OUTPATIENT)
Age: 25
End: 2025-02-03

## 2025-03-31 ENCOUNTER — RX RENEWAL (OUTPATIENT)
Age: 25
End: 2025-03-31

## 2025-04-16 NOTE — ED PEDIATRIC NURSE REASSESSMENT NOTE - SYMPTOMS
No protocol for requested medication.    Medication: gabapentin  Last office visit date: 04/11/2025  Pharmacy: [facility]  PHARMACY - [place], [place] - [address]    Order pended, routed to clinician for review.    
Per PDMP review, patient picked up refill on 4/14/2025.     Kim Littlejohn RN    
none

## 2025-06-03 ENCOUNTER — APPOINTMENT (OUTPATIENT)
Dept: ENDOCRINOLOGY | Facility: CLINIC | Age: 25
End: 2025-06-03
Payer: COMMERCIAL

## 2025-06-03 ENCOUNTER — NON-APPOINTMENT (OUTPATIENT)
Age: 25
End: 2025-06-03

## 2025-06-03 VITALS
DIASTOLIC BLOOD PRESSURE: 60 MMHG | WEIGHT: 89 LBS | TEMPERATURE: 98 F | OXYGEN SATURATION: 97 % | HEIGHT: 60 IN | SYSTOLIC BLOOD PRESSURE: 100 MMHG | HEART RATE: 100 BPM | BODY MASS INDEX: 17.47 KG/M2 | RESPIRATION RATE: 16 BRPM

## 2025-06-03 DIAGNOSIS — E03.8 OTHER SPECIFIED HYPOTHYROIDISM: ICD-10-CM

## 2025-06-03 DIAGNOSIS — E29.1 TESTICULAR HYPOFUNCTION: ICD-10-CM

## 2025-06-03 DIAGNOSIS — I10 ESSENTIAL (PRIMARY) HYPERTENSION: ICD-10-CM

## 2025-06-03 DIAGNOSIS — E27.40 UNSPECIFIED ADRENOCORTICAL INSUFFICIENCY: ICD-10-CM

## 2025-06-03 PROCEDURE — 99214 OFFICE O/P EST MOD 30 MIN: CPT

## 2025-06-03 PROCEDURE — G2211 COMPLEX E/M VISIT ADD ON: CPT | Mod: NC

## 2025-06-25 ENCOUNTER — NON-APPOINTMENT (OUTPATIENT)
Age: 25
End: 2025-06-25

## 2025-09-01 ENCOUNTER — RX RENEWAL (OUTPATIENT)
Age: 25
End: 2025-09-01

## 2025-09-01 RX ORDER — SYRINGE WITH NEEDLE, 1 ML 25GX5/8"
23G X 1" SYRINGE, EMPTY DISPOSABLE MISCELLANEOUS
Qty: 2 | Refills: 1 | Status: ACTIVE | COMMUNITY
Start: 2025-09-01 | End: 1900-01-01

## 2025-09-02 ENCOUNTER — RX RENEWAL (OUTPATIENT)
Age: 25
End: 2025-09-02